# Patient Record
Sex: MALE | Race: OTHER | HISPANIC OR LATINO | URBAN - METROPOLITAN AREA
[De-identification: names, ages, dates, MRNs, and addresses within clinical notes are randomized per-mention and may not be internally consistent; named-entity substitution may affect disease eponyms.]

---

## 2021-01-19 ENCOUNTER — EMERGENCY (EMERGENCY)
Facility: HOSPITAL | Age: 21
LOS: 1 days | Discharge: HOME | End: 2021-01-19
Attending: EMERGENCY MEDICINE | Admitting: EMERGENCY MEDICINE
Payer: SELF-PAY

## 2021-01-19 VITALS
RESPIRATION RATE: 18 BRPM | SYSTOLIC BLOOD PRESSURE: 144 MMHG | WEIGHT: 125 LBS | HEART RATE: 114 BPM | DIASTOLIC BLOOD PRESSURE: 96 MMHG | OXYGEN SATURATION: 99 % | HEIGHT: 65 IN | TEMPERATURE: 98 F

## 2021-01-19 VITALS
SYSTOLIC BLOOD PRESSURE: 128 MMHG | TEMPERATURE: 98 F | HEART RATE: 90 BPM | RESPIRATION RATE: 17 BRPM | OXYGEN SATURATION: 99 % | DIASTOLIC BLOOD PRESSURE: 80 MMHG
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
## 2021-01-19 DIAGNOSIS — M53.3 SACROCOCCYGEAL DISORDERS, NOT ELSEWHERE CLASSIFIED: ICD-10-CM

## 2021-01-19 DIAGNOSIS — S39.94XA UNSPECIFIED INJURY OF EXTERNAL GENITALS, INITIAL ENCOUNTER: ICD-10-CM

## 2021-01-19 DIAGNOSIS — N50.812 LEFT TESTICULAR PAIN: ICD-10-CM
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
## 2021-01-19 DIAGNOSIS — N50.811 RIGHT TESTICULAR PAIN: ICD-10-CM

## 2021-01-19 LAB
APPEARANCE UR: CLEAR — SIGNIFICANT CHANGE UP
BILIRUB UR-MCNC: NEGATIVE — SIGNIFICANT CHANGE UP
COLOR SPEC: YELLOW — SIGNIFICANT CHANGE UP
DIFF PNL FLD: NEGATIVE — SIGNIFICANT CHANGE UP
GLUCOSE UR QL: NEGATIVE — SIGNIFICANT CHANGE UP
KETONES UR-MCNC: 15 MG/DL
LEUKOCYTE ESTERASE UR-ACNC: NEGATIVE — SIGNIFICANT CHANGE UP
NITRITE UR-MCNC: NEGATIVE — SIGNIFICANT CHANGE UP
PH UR: 6.5 — SIGNIFICANT CHANGE UP (ref 5–8)
PROT UR-MCNC: NEGATIVE MG/DL — SIGNIFICANT CHANGE UP
SP GR SPEC: 1.02 — SIGNIFICANT CHANGE UP (ref 1–1.03)
UROBILINOGEN FLD QL: 0.2 E.U./DL — SIGNIFICANT CHANGE UP

## 2021-01-19 PROCEDURE — 99284 EMERGENCY DEPT VISIT MOD MDM: CPT

## 2021-01-19 PROCEDURE — 76870 US EXAM SCROTUM: CPT

## 2021-01-19 PROCEDURE — 76870 US EXAM SCROTUM: CPT | Mod: 26

## 2021-01-19 PROCEDURE — 72220 X-RAY EXAM SACRUM TAILBONE: CPT | Mod: 26

## 2021-01-19 PROCEDURE — 99053 MED SERV 10PM-8AM 24 HR FAC: CPT

## 2021-01-19 PROCEDURE — 72220 X-RAY EXAM SACRUM TAILBONE: CPT

## 2021-01-19 PROCEDURE — 81003 URINALYSIS AUTO W/O SCOPE: CPT

## 2021-01-19 NOTE — ED PROVIDER NOTE - MUSCULOSKELETAL, MLM
Spine appears normal, mild ttp over sacrum/coccyx, no ttp coccyx on latoya,  range of motion is not limited, no muscle or joint tenderness

## 2021-01-19 NOTE — ED PROVIDER NOTE - NSFOLLOWUPINSTRUCTIONS_ED_ALL_ED_FT
Scrotal trauma (straddle injury)    Please follow up with urology on Friday - call for an appointment.      Ice and elevate your scrotum, wear supportive underwear; use a protective cup when doing gymnastics.  Return for increased pain, swelling, bruising, difficulty or inability to urinate, increased numbness or weakness in legs, incontinence, any other concerns.    Take ibuprofen 1 tab every 6 hours with food as needed for pain.      Straddle Injuries      A straddle injury is an injury to the crotch area that occurs when a person falls while standing over or straddling an object. The area injured can involve the soft tissues, external genitalia, urinary organs, or rectum. Straddle injuries may result in a simple bruise (contusion) or scrape (abrasion). They can also cause more serious damage to the genitals, urinary tract, or bones in the pelvis.     Straddle injuries occur in both males and females. These injuries are more common in children.      What are the causes?  This type of injury may be caused by:  •Blunt trauma, such as landing on a bicycle crossbar, a fence, or playground equipment.      •A penetrating injury, such as being impaled by a sharp object.        What are the signs or symptoms?  Symptoms vary depending on the type of injury and how severe it is. Common symptoms include:  •Pain between the legs or in the genital area.      •Blood at the tip of the penis in men or the urethra in women.      •Bruising between the legs or in the genital area.      •Swelling between the legs or in the genital area.      •Difficulty urinating.        How is this diagnosed?  This condition may be diagnosed based on:  •A physical exam.      •Your symptoms and medical history.    •Imaging tests, such as:  •X-rays.      •CT scan.      •A specialized test to check for damage to the urinary tract (retrograde urethrography).          How is this treated?  Treatment for this condition depends on the location and severity of the injury. Treatment may include:  •Cold therapy to reduce swelling.      •Medicines for pain.      •A tube (suprapubic catheter) to drain urine from the bladder. This may be needed for more severe injuries, such as a damaged urethra or a pelvic bone fracture.    •For severe injuries, surgery may be done to:  •Stop severe bleeding (hemorrhage).      •Drain urine and blood.      •Realign the urethra.          Follow these instructions at home:    Medicines     •Take over-the-counter and prescription medicines only as told by your health care provider.      • Do not drive or use heavy machinery while taking prescription pain medicine.    •If you are taking prescription pain medicine, take actions to prevent or treat constipation. Your health care provider may recommend that you:   •Eat foods that are high in fiber, such as fresh fruits and vegetables, whole grains, and beans.       •Limit foods that are high in fat and processed sugars, such as fried or sweet foods.       •Take an over-the-counter or prescription medicine for constipation.        Wound care   •Follow instructions from your health care provider about how to take care of your wound. If your wound was covered with a bandage (dressing), make sure you:  •Wash your hands with soap and water before you change your dressing. If soap and water are not available, use hand .      •Change your dressing as told by your health care provider.      •Leave stitches (sutures), skin glue, or adhesive strips in place. These skin closures may need to stay in place for 2 weeks or longer. If adhesive strip edges start to loosen and curl up, you may trim the loose edges. Do not remove adhesive strips completely unless your health care provider tells you to do that.      •Check your wound every day for signs of infection. Check for:   •More redness, swelling, or pain.       •More fluid or blood.       •Pus or a bad smell.      •Warmth.           Managing pain, stiffness, and swelling    •If directed, apply ice to the injured area:  •Put ice in a plastic bag.      •Place a towel between your skin and the bag.      •Leave the ice on for 20 minutes, 2–3 times a day. Do this for the first 2 days after the injury, or as told by your health care provider.        General instructions     •Rest and limit your activity as told by your health care provider.      • Do not use any products that contain nicotine or tobacco, such as cigarettes and e-cigarettes. These can delay healing. If you need help quitting, ask your health care provider.      •Drink enough fluid to keep your urine pale yellow.      •Keep all follow-up visits as told by your health care provider. This is important.        Contact a health care provider if you have:  •Signs of infection, which may include:  •More redness, swelling, or pain around your wound.      •More fluid or blood coming from your wound.      •Warmth in or around your wound.      •Pus or a bad smell coming from your wound.      •A fever.        •Pain that is not relieved with medicine.      •Blood in your urine.        Get help right away if you have:    •Severe pain.      •Difficulty starting your urine, or inability to urinate.      •Pain while urinating.      •Shaking chills.        Summary    •A straddle injury is an injury to the crotch area that occurs when a person falls while straddling an object.      •The area injured can involve the soft tissues, external genitalia, urinary organs, or rectum.      •Common symptoms of this condition include pain, bleeding, bruising, and swelling in the genital area and difficulty urinating.      •Treatment depends on the type and location of the injury and how severe it is. Treatment may include cold therapy, pain medicine, a tube to drain urine, or surgery.

## 2021-01-19 NOTE — ED ADULT TRIAGE NOTE - OTHER COMPLAINTS
fell and landed on testicles, hitting testicles on a beam/bar fell and landed on testicles, hitting testicles on a beam/bar few hours ago

## 2021-01-19 NOTE — ED PROVIDER NOTE - GENITOURINARY, MLM
No discharge, lesions. no blood at meatus, nl penis and testicles w/o edema, ecchymosis, + mild bilat testicular ttp w/o mass, nl cremasteric reflex bilat

## 2021-01-19 NOTE — ED PROVIDER NOTE - PATIENT PORTAL LINK FT
You can access the FollowMyHealth Patient Portal offered by [facility] by registering at the following website: [email or web address]. By joining [facility]’s FollowMyHealth portal, you will also be able to view your health information using other applications (apps) compatible with our system.

## 2021-01-19 NOTE — ED PROVIDER NOTE - CLINICAL SUMMARY MEDICAL DECISION MAKING FREE TEXT BOX
Pt c/o straddle injury w pin/needle sensation in testicles and coccyx area pain w/o neuro deficits, no saddle anesthesia and nl rectal tone, mild ttp scrotum w minimal swelling/ecchymosis.  Pt declined pain meds.  Plan xray, scrotal u/s, pt given ice pack.  UA pending.  Reassess.

## 2021-01-19 NOTE — ED PROVIDER NOTE - OBJECTIVE STATEMENT
21 yo male no pmh c/o straddle injury to testicles and coccyx when pt fell while on a balance beam ~ 3p tonight.  Pt denies pain but notes some pins/needles sensation in testicles w/o sig swelling, bruising.  No blood at meatus, no hematuria, no dysuria and urinating w/o difficulty.  No numbness, weakness in le.  Pt notes some pain at tailbone as well w/o other back pain.  Pt notes some pain in lower abd similar to if he'd done crunches.  Pain much improved since time of incident.  Pt given 600 mg ibuprofen at [facility] but was told it would be a long time before eval due to many pts and their system going down so pt came to [facility].  No incontinence.

## 2021-01-19 NOTE — ED ADULT NURSE NOTE - OBJECTIVE STATEMENT
fell and landed on testicles, hitting testicles on a beam/bar few hours ago  states went to [facility] and wait was too long, mainly concerned with testicular numbness at present

## 2021-01-19 NOTE — ED PROVIDER NOTE - NSFOLLOWUPCLINICS_GEN_ALL_ED_FT
[facility]  Urology  [address] [place], [address] [place] [address]  Phone: [phone]  Fax:   Follow Up Time: 4-6 Days

## 2021-01-19 NOTE — ED PROVIDER NOTE - NEUROLOGICAL, MLM
awake, alert, oriented x 3, CN II-XII grossly intact, motor 5/5, no gross sens deficits, gait steady, no ataxia, speech clear.  no saddle anesthesia, nl rectal tone

## 2021-01-19 NOTE — ED PROVIDER NOTE - PROGRESS NOTE DETAILS
MAGDALENO consulted and will eval. US/xray neg, cleared for outpt fu on Fri at  clinic by urology.  Obie garcia.

## 2021-01-20 RX ORDER — IBUPROFEN 200 MG
1 TABLET ORAL
Qty: 30 | Refills: 0
Start: 2021-01-20

## 2021-01-20 NOTE — CONSULT NOTE ADULT - ASSESSMENT
19 yo male no pmh c/o straddle injury to testicles and coccyx when pt fell while on a balance beam ~ 3p tonight.  Pt denies pain but notes some pins/needles sensation in testicles w/o sig swelling, bruising. US testicles wnl.      Plan:  -no signs of urinary tract damage after blunt trauma  -f/u [facility] on Friday

## 2021-01-20 NOTE — CONSULT NOTE ADULT - SUBJECTIVE AND OBJECTIVE BOX
21 yo male no pmh c/o straddle injury to testicles and coccyx when pt fell while on a balance beam ~ 3p tonight.  Pt denies pain but notes some pins/needles sensation in testicles w/o sig swelling, bruising.  No blood at meatus, no hematuria, no dysuria and urinating w/o difficulty.  No numbness, weakness in le.  Pt notes some pain at tailbone as well w/o other back pain.  Pt notes some pain in lower abd similar to if he'd done crunches.  Pain much improved since time of incident.  Pt given 600 mg ibuprofen at [facility] [place] [facility] but was told it would be a long time before eval due to many pts and their system going down so pt came to [facility].  No incontinence.    Vital Signs Last 24 Hrs  T(C): 36.7 (19 Jan 2021 23:55), Max: 36.7 (19 Jan 2021 23:55)  T(F): 98 (19 Jan 2021 23:55), Max: 98 (19 Jan 2021 23:55)  HR: 90 (19 Jan 2021 23:55) (90 - 114)  BP: 128/80 (19 Jan 2021 23:55) (128/80 - 144/96)  BP(mean): --  RR: 17 (19 Jan 2021 23:55) (17 - 18)  SpO2: 99% (19 Jan 2021 23:55) (99% - 99%)  I&O's Summary      PE:  Gen: awake and alert  Abd: nontender, nondistended  : no suprapubic/CVAT.  testicular exam wnl.    US:  IMPRESSION:  1.  No acute pathology.  2.  Bilateral testicular microlithiasis, which is of uncertain clinical significance.
